# Patient Record
Sex: FEMALE | Race: BLACK OR AFRICAN AMERICAN | NOT HISPANIC OR LATINO | URBAN - METROPOLITAN AREA
[De-identification: names, ages, dates, MRNs, and addresses within clinical notes are randomized per-mention and may not be internally consistent; named-entity substitution may affect disease eponyms.]

---

## 2024-06-19 ENCOUNTER — HOSPITAL ENCOUNTER (EMERGENCY)
Facility: HOSPITAL | Age: 39
Discharge: HOME | End: 2024-06-19
Attending: EMERGENCY MEDICINE
Payer: COMMERCIAL

## 2024-06-19 VITALS
OXYGEN SATURATION: 97 % | SYSTOLIC BLOOD PRESSURE: 115 MMHG | HEIGHT: 64 IN | TEMPERATURE: 98 F | WEIGHT: 130 LBS | RESPIRATION RATE: 18 BRPM | DIASTOLIC BLOOD PRESSURE: 82 MMHG | HEART RATE: 82 BPM | BODY MASS INDEX: 22.2 KG/M2

## 2024-06-19 DIAGNOSIS — A59.9 TRICHOMONAS INFECTION: ICD-10-CM

## 2024-06-19 DIAGNOSIS — E86.0 DEHYDRATION: ICD-10-CM

## 2024-06-19 DIAGNOSIS — R55 SYNCOPE, UNSPECIFIED SYNCOPE TYPE: Primary | ICD-10-CM

## 2024-06-19 DIAGNOSIS — R55 SYNCOPE AND COLLAPSE: ICD-10-CM

## 2024-06-19 DIAGNOSIS — R55 SYNCOPE: ICD-10-CM

## 2024-06-19 LAB
ALBUMIN SERPL-MCNC: 3.8 G/DL (ref 3.5–5)
ALBUMIN/GLOB SERPL: 0.9 RATIO (ref 1.1–2)
ALP SERPL-CCNC: 70 UNIT/L (ref 40–150)
ALT SERPL-CCNC: 8 UNIT/L (ref 0–55)
AMPHET UR QL SCN: NEGATIVE
ANION GAP SERPL CALC-SCNC: 7 MEQ/L
AST SERPL-CCNC: 19 UNIT/L (ref 5–34)
B-HCG FREE SERPL-ACNC: <2.42 MIU/ML
B-HCG UR QL: NEGATIVE
BACTERIA #/AREA URNS AUTO: ABNORMAL /HPF
BARBITURATE SCN PRESENT UR: NEGATIVE
BASOPHILS # BLD AUTO: 0.04 X10(3)/MCL
BASOPHILS NFR BLD AUTO: 0.5 %
BENZODIAZ UR QL SCN: NEGATIVE
BILIRUB SERPL-MCNC: 0.6 MG/DL
BILIRUB UR QL STRIP.AUTO: ABNORMAL
BUN SERPL-MCNC: 6.6 MG/DL (ref 7–18.7)
CALCIUM SERPL-MCNC: 9 MG/DL (ref 8.4–10.2)
CANNABINOIDS UR QL SCN: POSITIVE
CHLORIDE SERPL-SCNC: 104 MMOL/L (ref 98–107)
CLARITY UR: CLEAR
CO2 SERPL-SCNC: 25 MMOL/L (ref 22–29)
COCAINE UR QL SCN: NEGATIVE
COLOR UR AUTO: ABNORMAL
CREAT SERPL-MCNC: 0.71 MG/DL (ref 0.55–1.02)
CREAT/UREA NIT SERPL: 9
D DIMER PPP IA.FEU-MCNC: 0.66 UG/ML FEU (ref 0–0.5)
EOSINOPHIL # BLD AUTO: 0.05 X10(3)/MCL (ref 0–0.9)
EOSINOPHIL NFR BLD AUTO: 0.6 %
ERYTHROCYTE [DISTWIDTH] IN BLOOD BY AUTOMATED COUNT: 12.7 % (ref 11.5–17)
GFR SERPLBLD CREATININE-BSD FMLA CKD-EPI: >60 ML/MIN/1.73/M2
GLOBULIN SER-MCNC: 4.1 GM/DL (ref 2.4–3.5)
GLUCOSE SERPL-MCNC: 92 MG/DL (ref 74–100)
GLUCOSE UR QL STRIP: NEGATIVE
HCT VFR BLD AUTO: 41.4 % (ref 37–47)
HGB BLD-MCNC: 13.8 G/DL (ref 12–16)
HGB UR QL STRIP: ABNORMAL
IMM GRANULOCYTES # BLD AUTO: 0.01 X10(3)/MCL (ref 0–0.04)
IMM GRANULOCYTES NFR BLD AUTO: 0.1 %
KETONES UR QL STRIP: NEGATIVE
LEUKOCYTE ESTERASE UR QL STRIP: ABNORMAL
LYMPHOCYTES # BLD AUTO: 1.19 X10(3)/MCL (ref 0.6–4.6)
LYMPHOCYTES NFR BLD AUTO: 14.4 %
MAGNESIUM SERPL-MCNC: 1.8 MG/DL (ref 1.6–2.6)
MCH RBC QN AUTO: 31.4 PG (ref 27–31)
MCHC RBC AUTO-ENTMCNC: 33.3 G/DL (ref 33–36)
MCV RBC AUTO: 94.3 FL (ref 80–94)
MONOCYTES # BLD AUTO: 0.45 X10(3)/MCL (ref 0.1–1.3)
MONOCYTES NFR BLD AUTO: 5.4 %
NEUTROPHILS # BLD AUTO: 6.52 X10(3)/MCL (ref 2.1–9.2)
NEUTROPHILS NFR BLD AUTO: 79 %
NITRITE UR QL STRIP: NEGATIVE
OPIATES UR QL SCN: POSITIVE
PCP UR QL: NEGATIVE
PH UR STRIP: 6 [PH]
PH UR: 6 [PH] (ref 3–11)
PLATELET # BLD AUTO: 296 X10(3)/MCL (ref 130–400)
PMV BLD AUTO: 9.6 FL (ref 7.4–10.4)
POTASSIUM SERPL-SCNC: 4.4 MMOL/L (ref 3.5–5.1)
PROT SERPL-MCNC: 7.9 GM/DL (ref 6.4–8.3)
PROT UR QL STRIP: 100
RBC # BLD AUTO: 4.39 X10(6)/MCL (ref 4.2–5.4)
RBC #/AREA URNS AUTO: ABNORMAL /HPF
SODIUM SERPL-SCNC: 136 MMOL/L (ref 136–145)
SP GR UR STRIP.AUTO: >=1.03 (ref 1–1.03)
SPECIFIC GRAVITY, URINE AUTO (.000) (OHS): >=1.03 (ref 1–1.03)
SQUAMOUS #/AREA URNS AUTO: ABNORMAL /HPF
TRICHOMONAS UR QL COMP ASSIST: ABNORMAL /HPF
TSH SERPL-ACNC: 1 UIU/ML (ref 0.35–4.94)
UROBILINOGEN UR STRIP-ACNC: 1
WBC # BLD AUTO: 8.26 X10(3)/MCL (ref 4.5–11.5)
WBC #/AREA URNS AUTO: ABNORMAL /HPF

## 2024-06-19 PROCEDURE — 93005 ELECTROCARDIOGRAM TRACING: CPT

## 2024-06-19 PROCEDURE — 84702 CHORIONIC GONADOTROPIN TEST: CPT | Performed by: EMERGENCY MEDICINE

## 2024-06-19 PROCEDURE — 85379 FIBRIN DEGRADATION QUANT: CPT | Performed by: EMERGENCY MEDICINE

## 2024-06-19 PROCEDURE — 99285 EMERGENCY DEPT VISIT HI MDM: CPT | Mod: 25

## 2024-06-19 PROCEDURE — 85025 COMPLETE CBC W/AUTO DIFF WBC: CPT | Performed by: EMERGENCY MEDICINE

## 2024-06-19 PROCEDURE — 81003 URINALYSIS AUTO W/O SCOPE: CPT | Performed by: EMERGENCY MEDICINE

## 2024-06-19 PROCEDURE — 25000003 PHARM REV CODE 250: Performed by: EMERGENCY MEDICINE

## 2024-06-19 PROCEDURE — 81025 URINE PREGNANCY TEST: CPT | Performed by: EMERGENCY MEDICINE

## 2024-06-19 PROCEDURE — 80053 COMPREHEN METABOLIC PANEL: CPT | Performed by: EMERGENCY MEDICINE

## 2024-06-19 PROCEDURE — 84443 ASSAY THYROID STIM HORMONE: CPT | Performed by: EMERGENCY MEDICINE

## 2024-06-19 PROCEDURE — 96360 HYDRATION IV INFUSION INIT: CPT

## 2024-06-19 PROCEDURE — 80307 DRUG TEST PRSMV CHEM ANLYZR: CPT | Performed by: EMERGENCY MEDICINE

## 2024-06-19 PROCEDURE — 93010 ELECTROCARDIOGRAM REPORT: CPT | Mod: ,,, | Performed by: INTERNAL MEDICINE

## 2024-06-19 PROCEDURE — 83735 ASSAY OF MAGNESIUM: CPT | Performed by: EMERGENCY MEDICINE

## 2024-06-19 RX ORDER — METRONIDAZOLE 500 MG/1
500 TABLET ORAL EVERY 12 HOURS
Qty: 14 TABLET | Refills: 0 | Status: SHIPPED | OUTPATIENT
Start: 2024-06-19 | End: 2024-06-26

## 2024-06-19 RX ADMIN — SODIUM CHLORIDE 1000 ML: 9 INJECTION, SOLUTION INTRAVENOUS at 02:06

## 2024-06-19 NOTE — ED PROVIDER NOTES
Encounter Date: 6/19/2024       History     Chief Complaint   Patient presents with    Loss of Consciousness     Pt was at work at [employer]. She said she passed out about 30 minutes ago. She is unsure how long she was passed out for. She said she felt overheated prior to passing out.  She complaints of lower back pain and that she bit her lip. Unsure if she hit her head. Not on any medications.     Chief Complaint  Patient presents with  · Loss of Consciousness    Pt was at work at [employer]. She said she passed out about 30 minutes ago. She is unsure how long she was passed out for. She said she felt overheated prior to passing out.  She complaints of lower back pain and that she bit her lip. Unsure if she hit her head. Not on any medications.      Patient is a 39-year-old female with no significant past medical history who presents to the emergency room after a syncopal episode.  She was at work at [employer] doing many [employer] outdoors and said that she became very fatigued and weak.  She had a sense of being overheated.  She says that she then had a syncopal episode.  She reports feeling completely back to her baseline currently and only complains of mild lower back pain and the pain where she bit her lip during the fall.      Review of patient's allergies indicates:  No Known Allergies  No past medical history on file.  No past surgical history on file.  No family history on file.     Review of Systems   Constitutional:  Negative for chills and fever.   HENT:  Negative for congestion, rhinorrhea, sneezing, sore throat and trouble swallowing.    Eyes: Negative.    Respiratory:  Negative for cough and shortness of breath.    Cardiovascular:  Negative for chest pain, palpitations and leg swelling.   Gastrointestinal:  Negative for abdominal distention, abdominal pain, diarrhea, nausea and vomiting.   Genitourinary:  Negative for difficulty urinating, dysuria, flank pain, frequency, hematuria and urgency.   Musculoskeletal:   Negative for back pain, joint swelling, myalgias and neck pain.   Skin:  Negative for rash.   Neurological:  Positive for syncope. Negative for seizures, weakness, light-headedness and headaches.   Hematological: Negative.    Psychiatric/Behavioral:  Negative for hallucinations, sleep disturbance and suicidal ideas.    All other systems reviewed and are negative.      Physical Exam     Initial Vitals [06/19/24 1155]   BP Pulse Resp Temp SpO2   114/80 88 18 98.4 °F (36.9 °C) 100 %      MAP       --         Physical Exam    Nursing note and vitals reviewed.  Constitutional: Vital signs are normal. She appears well-nourished. She is cooperative.  Non-toxic appearance. She does not appear ill. No distress.   HENT:   Head: Normocephalic and atraumatic.   Mouth/Throat: Uvula is midline and oropharynx is clear and moist. No trismus in the jaw.   Eyes: Conjunctivae, EOM and lids are normal. Pupils are equal, round, and reactive to light.   Neck: Trachea normal. Neck supple. No stridor present. No tracheal deviation present. No JVD present.   Normal range of motion.  Cardiovascular:  Normal rate, regular rhythm, normal heart sounds and normal pulses.           No murmur heard.  Abdominal: Abdomen is soft. Bowel sounds are normal. There is no abdominal tenderness. There is no rebound and no guarding.   Musculoskeletal:      Cervical back: Normal range of motion and neck supple. No rigidity.     Neurological: She is alert and oriented to person, place, and time. She has normal strength. No cranial nerve deficit or sensory deficit. GCS eye subscore is 4. GCS verbal subscore is 5. GCS motor subscore is 6.   Skin: Skin is warm, dry and intact. Capillary refill takes less than 2 seconds.   Psychiatric: She has a normal mood and affect. Her behavior is normal. Judgment normal. She is not actively hallucinating. Thought content is not delusional. She expresses no homicidal and no suicidal ideation.         ED Course    Procedures  Labs Reviewed   COMPREHENSIVE METABOLIC PANEL - Abnormal; Notable for the following components:       Result Value    Blood Urea Nitrogen 6.6 (*)     Globulin 4.1 (*)     Albumin/Globulin Ratio 0.9 (*)     All other components within normal limits   URINALYSIS, REFLEX TO URINE CULTURE - Abnormal; Notable for the following components:    Protein,  (*)     Blood, UA Trace-Lysed (*)     Bilirubin, UA Small (*)     Leukocyte Esterase, UA Trace (*)     All other components within normal limits   DRUG SCREEN, URINE (BEAKER) - Abnormal; Notable for the following components:    Cannabinoids, Urine Positive (*)     Opiates, Urine Positive (*)     All other components within normal limits    Narrative:     Cut off concentrations:    Amphetamines - 1000 ng/ml  Barbiturates - 200 ng/ml  Benzodiazepine - 200 ng/ml  Cannabinoids (THC) - 50 ng/ml  Cocaine - 300 ng/ml  Fentanyl - 1.0 ng/ml  MDMA - 500 ng/ml  Opiates - 300 ng/ml   Phencyclidine (PCP) - 25 ng/ml    Specimen submitted for drug analysis and tested for pH and specific gravity in order to evaluate sample integrity. Suspect tampering if specific gravity is <1.003 and/or pH is not within the range of 4.5 - 8.0  False negatives may result form substances such as bleach added to urine.  False positives may result for the presence of a substance with similar chemical structure to the drug or its metabolite.    This test provides only a PRELIMINARY analytical test result. A more specific alternate chemical method must be used in order to obtain a confirmed analytical result. Gas chromatography/mass spectrometry (GC/MS) is the preferred confirmatory method. Other chemical confirmation methods are available. Clinical consideration and professional judgement should be applied to any drug of abuse test result, particularly when preliminary positive results are used.    Positive results will be confirmed only at the physicians request. Unconfirmed screening  results are to be used only for medical purposes (treatment).        CBC WITH DIFFERENTIAL - Abnormal; Notable for the following components:    MCV 94.3 (*)     MCH 31.4 (*)     All other components within normal limits   URINALYSIS, MICROSCOPIC - Abnormal; Notable for the following components:    Bacteria, UA Few (*)     Trichomonas, UA Few (*)     Squamous Epithelial Cells, UA Moderate (*)     All other components within normal limits   HCG, QUANTITATIVE - Normal   PREGNANCY TEST, URINE RAPID - Normal   TSH - Normal   MAGNESIUM - Normal   CBC W/ AUTO DIFFERENTIAL    Narrative:     The following orders were created for panel order CBC auto differential.  Procedure                               Abnormality         Status                     ---------                               -----------         ------                     CBC with Differential[[id number]]       Abnormal            Final result                 Please view results for these tests on the individual orders.   D DIMER, QUANTITATIVE     EKG Readings: (Independently Interpreted)   Rhythm: Normal Sinus Rhythm. Ectopy: No Ectopy. Conduction: Normal. ST Segments: Normal ST Segments. T Waves: Normal. Clinical Impression: Normal Sinus Rhythm   An EKG will be performed to rule out arrhythmias such as atrial fibrillation, SVT, V. tach profound bradycardia.  We will also review the EKG or evidence of ischemic change or ST elevation MI changes that would necessitate urgent interventional request      Rate 80       Imaging Results              X-Ray Chest AP Portable (Final result)  Result time 06/19/24 14:06:52      Final result by Nesha Lobo MD (06/19/24 14:06:52)                   Impression:      No acute abnormality of the chest.      Electronically signed by: Nesha Lobo  Date:    06/19/2024  Time:    14:06               Narrative:    EXAMINATION:  XR CHEST AP PORTABLE    CLINICAL HISTORY:  Chest Pain;    COMPARISON:  None    FINDINGS:  The  heart is normal in size.  The lungs are clear.  There is no pleural effusion or visible pneumothorax.                                       Medications   sodium chloride 0.9% bolus 1,000 mL 1,000 mL (1,000 mLs Intravenous New Bag 6/19/24 1410)     Medical Decision Making  CBC was obtained to evaluate for anemia and for evidence of infection that could be seen with a white blood cell count elevation.    A CMP was ordered to evaluate the renal function for evidence of acute kidney injury based on an elevated creatinine, uremia based on an elevated BUN or suggestion of dehydration but elevated BUN/creatinine ratio.  We will also be able to screen for liver or biliary tract disease by assessing the AST/ALT levels.  Biliary obstruction can be assessed by reviewing the alkaline phosphatase and bilirubin levels.  Electrolyte abnormalities will also be ruled out with levels of the potassium, sodium and magnesium levels being evaluated.  A glucose reading will rule out hypo or hyperglycemia.  Will calculate the anion gap and assess for metabolic acidosis.    A urinalysis will be done to rule out UTI.  Hematuria on the urinalysis may suggest a kidney stone or other pathology.  We will also assess for proteinuria on the urinalysis.    Cardiac enzymes including a CK and troponin will be obtained to rule out acute coronary syndrome or evidence of NSTEMI or other evidence of myocardial injury that could be present.    An EKG will be performed to rule out arrhythmias such as atrial fibrillation, SVT, V. tach profound bradycardia.  We will also review the EKG or evidence of ischemic change or ST elevation MI changes that would necessitate urgent interventional request    A chest x-ray will be performed to rule out pneumonia, pneumothorax, pneumomediastinum, pulmonary edema, cardiomegaly, pleural effusions, mass lesions or suggestions of a pericardial effusion.    The cardiac monitor revealed normal sinus rhythm as interpreted by  me. The cardiac monitor was ordered secondary to the patient's history of syncope and to monitor the patient for dysrhythmia    Amount and/or Complexity of Data Reviewed  Labs: ordered. Decision-making details documented in ED Course.  Radiology: ordered. Decision-making details documented in ED Course.  ECG/medicine tests:  Decision-making details documented in ED Course.    Risk  Prescription drug management.      Additional MDM:   Differential Diagnosis:   Symptom: Chest pain. <> The follow diagnoses were considered and will be evaluated: Pulmonary Embolism and ST Elevation MI.   Other: The following diagnoses were also considered and will be evaluated: Dehydration, Syncope and Vagal reaction.            ED Course as of 06/19/24 1424   Wed Jun 19, 2024   1411 Trichomonas, UA(!): Few  We will treat with metronidazole [DB]   1411 EKG 12-lead  Normal [DB]   1411 Cannabinoids, Urine(!): Positive [DB]   1412 Opiates, Urine(!): Positive [DB]   1415 X-Ray Chest AP Portable [DB]   1423 Cannabinoids, Urine(!): Positive [DB]   1423 Opiates, Urine(!): Positive  This patient presented with a history of a syncopal episode. The patient is now resting comfortably and feels better, is alert, and is in no distress. The repeat examination is unremarkable and benign. The patient is neurologically intact, has a normal mental status, and is ambulatory in the ED. The electrocardiogram shows no signs of acute ischemia, and the history, exam, diagnostic testing, and current condition do not suggest that this patient is having an acute myocardial infarction, significant arrhythmia, unstable angina, a stroke/TIA, a significant vascular event, gastrointestinal bleeding, sepsis, or other significant pathology that would warrant further testing, continued ED treatment, admission, or cardiology or other specialist consultation at this point. The vital signs have been stable. The patient's condition is stable and appropriate for discharge. The  patient will pursue further outpatient evaluation with the primary care physician, other designated physician, or cardiologist. The patient and/or caregivers have expressed a clear and thorough understanding and agree to follow up as instructed.   [DB]      ED Course User Index  [DB] Audi Restrepo MD                           Clinical Impression:  Final diagnoses:  [R55] Syncope  [R55] Syncope and collapse  [R55] Syncope, unspecified syncope type (Primary)  [E86.0] Dehydration  [A59.9] Trichomonas infection                 Audi Restrepo MD  06/19/24 3412

## 2024-06-20 LAB
OHS QRS DURATION: 58 MS
OHS QTC CALCULATION: 392 MS